# Patient Record
Sex: FEMALE | Race: BLACK OR AFRICAN AMERICAN | NOT HISPANIC OR LATINO | Employment: UNEMPLOYED | URBAN - METROPOLITAN AREA
[De-identification: names, ages, dates, MRNs, and addresses within clinical notes are randomized per-mention and may not be internally consistent; named-entity substitution may affect disease eponyms.]

---

## 2023-11-23 ENCOUNTER — APPOINTMENT (OUTPATIENT)
Dept: RADIOLOGY | Facility: HOSPITAL | Age: 43
End: 2023-11-23
Payer: COMMERCIAL

## 2023-11-23 ENCOUNTER — HOSPITAL ENCOUNTER (EMERGENCY)
Facility: HOSPITAL | Age: 43
Discharge: HOME | End: 2023-11-23
Attending: EMERGENCY MEDICINE
Payer: COMMERCIAL

## 2023-11-23 VITALS
HEIGHT: 68 IN | HEART RATE: 75 BPM | SYSTOLIC BLOOD PRESSURE: 124 MMHG | DIASTOLIC BLOOD PRESSURE: 72 MMHG | OXYGEN SATURATION: 99 % | TEMPERATURE: 98.2 F | RESPIRATION RATE: 16 BRPM | BODY MASS INDEX: 43.8 KG/M2 | WEIGHT: 289 LBS

## 2023-11-23 DIAGNOSIS — M25.532 LEFT WRIST PAIN: Primary | ICD-10-CM

## 2023-11-23 PROBLEM — D50.9 IRON DEFICIENCY ANEMIA: Status: ACTIVE | Noted: 2023-06-15

## 2023-11-23 PROBLEM — G47.33 OBSTRUCTIVE SLEEP APNEA OF ADULT: Status: ACTIVE | Noted: 2017-10-16

## 2023-11-23 PROBLEM — E78.2 MIXED HYPERLIPIDEMIA: Status: ACTIVE | Noted: 2023-06-13

## 2023-11-23 PROBLEM — R73.03 PREDIABETES: Status: ACTIVE | Noted: 2019-02-12

## 2023-11-23 PROCEDURE — 73630 X-RAY EXAM OF FOOT: CPT | Mod: RT

## 2023-11-23 PROCEDURE — 73502 X-RAY EXAM HIP UNI 2-3 VIEWS: CPT | Mod: LT,FY

## 2023-11-23 PROCEDURE — 99284 EMERGENCY DEPT VISIT MOD MDM: CPT | Performed by: EMERGENCY MEDICINE

## 2023-11-23 PROCEDURE — 73110 X-RAY EXAM OF WRIST: CPT | Mod: LEFT SIDE | Performed by: STUDENT IN AN ORGANIZED HEALTH CARE EDUCATION/TRAINING PROGRAM

## 2023-11-23 PROCEDURE — 73630 X-RAY EXAM OF FOOT: CPT | Mod: RIGHT SIDE | Performed by: STUDENT IN AN ORGANIZED HEALTH CARE EDUCATION/TRAINING PROGRAM

## 2023-11-23 PROCEDURE — 99284 EMERGENCY DEPT VISIT MOD MDM: CPT | Mod: 25 | Performed by: EMERGENCY MEDICINE

## 2023-11-23 PROCEDURE — 73110 X-RAY EXAM OF WRIST: CPT | Mod: LT

## 2023-11-23 PROCEDURE — 99285 EMERGENCY DEPT VISIT HI MDM: CPT | Mod: 25 | Performed by: EMERGENCY MEDICINE

## 2023-11-23 PROCEDURE — 73502 X-RAY EXAM HIP UNI 2-3 VIEWS: CPT | Mod: LEFT SIDE | Performed by: STUDENT IN AN ORGANIZED HEALTH CARE EDUCATION/TRAINING PROGRAM

## 2023-11-23 RX ORDER — ACETAMINOPHEN 325 MG/1
650 TABLET ORAL ONCE
Status: COMPLETED | OUTPATIENT
Start: 2023-11-23 | End: 2023-11-23

## 2023-11-23 RX ORDER — ACETAMINOPHEN 325 MG/1
325 TABLET ORAL ONCE
Status: COMPLETED | OUTPATIENT
Start: 2023-11-23 | End: 2023-11-23

## 2023-11-23 RX ORDER — ACETAMINOPHEN 325 MG/1
TABLET ORAL
Status: COMPLETED
Start: 2023-11-23 | End: 2023-11-23

## 2023-11-23 RX ADMIN — ACETAMINOPHEN 325 MG: 325 TABLET ORAL at 02:30

## 2023-11-23 RX ADMIN — ACETAMINOPHEN 650 MG: 325 TABLET ORAL at 02:26

## 2023-11-23 ASSESSMENT — PAIN DESCRIPTION - LOCATION: LOCATION: HIP

## 2023-11-23 ASSESSMENT — PAIN - FUNCTIONAL ASSESSMENT
PAIN_FUNCTIONAL_ASSESSMENT: 0-10
PAIN_FUNCTIONAL_ASSESSMENT: 0-10

## 2023-11-23 ASSESSMENT — PAIN SCALES - GENERAL
PAINLEVEL_OUTOF10: 6

## 2023-11-23 ASSESSMENT — PAIN DESCRIPTION - ORIENTATION: ORIENTATION: LEFT

## 2023-11-23 ASSESSMENT — COLUMBIA-SUICIDE SEVERITY RATING SCALE - C-SSRS
2. HAVE YOU ACTUALLY HAD ANY THOUGHTS OF KILLING YOURSELF?: NO
6. HAVE YOU EVER DONE ANYTHING, STARTED TO DO ANYTHING, OR PREPARED TO DO ANYTHING TO END YOUR LIFE?: NO
1. IN THE PAST MONTH, HAVE YOU WISHED YOU WERE DEAD OR WISHED YOU COULD GO TO SLEEP AND NOT WAKE UP?: NO

## 2023-11-23 ASSESSMENT — LIFESTYLE VARIABLES: REASON UNABLE TO ASSESS: YES

## 2023-11-23 NOTE — DISCHARGE INSTRUCTIONS
Go to your PCP in 1 week to obtain repeat XR of wrist. We gave you a wrist brace today to help with pain

## 2023-11-23 NOTE — PROGRESS NOTES
I received this patient during signout.  Please see previous provider's note for detailed H&P, labs and imaging.    Briefly, this is a 43-year-old female with history of morbid obesity who presents with hip pain, foot pain and thumb pain.  Patient had no signs of overt trauma on examination, denies blood thinner use.  She was ambulatory with steady gait.    Plan at the time of signout was await x-ray results and disposition patient.    Under my care, patient reassessed and remains clinically stable.  X-ray results reviewed, overall unremarkable for acute pathology.  Wrist film notable for degenerative changes and given snuffbox tenderness patient discharged with thumb spica brace.  Patient to follow-up outpatient in 1 week for x-ray.  She verbalized understanding.      Diagnoses as of 11/24/23 1913   Left wrist pain       Disposition: Discharged with wrist brace    Discharged in stable condition. Patient will follow-up with the primary physician in the next 2-3 days. Return if worse. They understand return precautions and discharge instructions. Patient and family/friend/caregiver are in agreement with this plan.         Kiesha Post DO   EM PGY2    Emergency Medicine Attending Attestation:     The patient was seen by the resident/fellow.  I have personally performed a substantive portion of the encounter.  I have seen and examined the patient and agree with the workup, evaluation, MDM, management and diagnosis.  The care plan has been discussed with the resident/fellow. I have reviewed the resident/fellow’s note and agree with the documented findings.    Robby Chavez MD

## 2023-11-23 NOTE — ED PROVIDER NOTES
HPI   No chief complaint on file.      Patient reports that 2 days ago she was at work carrying a box and was unable to see that some had left something on the floor.  While walking she lost her balance and landed on her left hip.  Reports that she was able to get up and walk following the accident, ad denies any head trauma or LOC.  She reports that the pain was manageable initially w/o anything interventions.  However today the pain today started to increase so the patient came to the ED to see about getting an x-ray of her left hip.  In addition, the patient endorse left wrist pain and right foot pain.    ROS negative unless noted in HPI        History provided by:  Patient   used: No                        No data recorded                Patient History   No past medical history on file.  No past surgical history on file.  No family history on file.  Social History     Tobacco Use    Smoking status: Not on file    Smokeless tobacco: Not on file   Substance Use Topics    Alcohol use: Not on file    Drug use: Not on file       Physical Exam   ED Triage Vitals [11/23/23 0125]   Temp Heart Rate Resp BP   36.8 °C (98.2 °F) 77 16 121/70      SpO2 Temp Source Heart Rate Source Patient Position   99 % Temporal Monitor --      BP Location FiO2 (%)     Left arm --       Physical Exam  Constitutional:       General: She is not in acute distress.     Appearance: Normal appearance. She is obese. She is not ill-appearing, toxic-appearing or diaphoretic.   HENT:      Head: Normocephalic and atraumatic.      Right Ear: External ear normal.      Left Ear: External ear normal.      Nose: Nose normal.      Mouth/Throat:      Mouth: Mucous membranes are moist.      Pharynx: Oropharynx is clear.   Eyes:      Conjunctiva/sclera: Conjunctivae normal.   Cardiovascular:      Rate and Rhythm: Normal rate and regular rhythm.      Pulses: Normal pulses.      Heart sounds: Normal heart sounds. No murmur heard.     No  friction rub. No gallop.   Pulmonary:      Effort: Pulmonary effort is normal. No respiratory distress.      Breath sounds: Normal breath sounds. No stridor. No wheezing, rhonchi or rales.   Chest:      Chest wall: No tenderness.   Abdominal:      General: Abdomen is flat. Bowel sounds are normal. There is no distension.      Palpations: Abdomen is soft.      Tenderness: There is no abdominal tenderness. There is no guarding.   Musculoskeletal:      Right wrist: Normal.      Left wrist: Tenderness present. No swelling, deformity, effusion or lacerations. Normal range of motion. Normal pulse.      Cervical back: Normal range of motion and neck supple.      Right hip: Normal.      Left hip: Tenderness present. No deformity, lacerations or bony tenderness. Normal range of motion. Normal strength.      Right foot: Normal range of motion. Tenderness present. No swelling or deformity. Normal pulse.      Left foot: Normal.      Comments: + tenderness along 5th metatarsal   Skin:     General: Skin is warm and dry.      Capillary Refill: Capillary refill takes less than 2 seconds.   Neurological:      General: No focal deficit present.      Mental Status: She is alert and oriented to person, place, and time.      Cranial Nerves: No cranial nerve deficit.      Sensory: No sensory deficit.      Motor: No weakness.      Coordination: Coordination normal.      Gait: Gait normal.   Psychiatric:         Mood and Affect: Mood normal.         Behavior: Behavior normal.         Thought Content: Thought content normal.         Judgment: Judgment normal.         ED Course & MDM        Medical Decision Making  Patient is 44 y/o F w/ obesity, prediabetes, MLD, nicotine use disorder, RADHA who presented to the ED for evaluation of multiple injuries s/p fall at work 2 days prior.  On exam patient is HDS and in NAD.  Physical exam demonstrated normal gait, full ROM, and the neurovascularly intact.  Patient was noted to have muscular  tenderness on palpitation of the left hip, tenderness along the scaphoid on left wrist, and positive for tenderness along the 5th metatarsal of the right foot.  Therefore ordered x-ray of the left hip, left wrist, and right foot to evaluate for fractures.  In addition gave the patient dose of tylenol.  Patient was signed out with imaging pending with dispo pending on imaging.          Procedure  Procedures     Camron Michael MD  Resident  11/23/23 0247